# Patient Record
Sex: MALE | Race: WHITE | Employment: FULL TIME | URBAN - METROPOLITAN AREA
[De-identification: names, ages, dates, MRNs, and addresses within clinical notes are randomized per-mention and may not be internally consistent; named-entity substitution may affect disease eponyms.]

---

## 2021-09-27 ENCOUNTER — HOSPITAL ENCOUNTER (OUTPATIENT)
Age: 51
Setting detail: OBSERVATION
Discharge: HOME | End: 2021-09-28
Attending: EMERGENCY MEDICINE
Payer: OTHER GOVERNMENT

## 2021-09-27 ENCOUNTER — APPOINTMENT (OUTPATIENT)
Dept: GENERAL RADIOLOGY | Age: 51
End: 2021-09-27
Payer: OTHER GOVERNMENT

## 2021-09-27 ENCOUNTER — APPOINTMENT (OUTPATIENT)
Dept: CT IMAGING | Age: 51
End: 2021-09-27
Payer: OTHER GOVERNMENT

## 2021-09-27 ENCOUNTER — APPOINTMENT (OUTPATIENT)
Dept: ULTRASOUND IMAGING | Age: 51
End: 2021-09-27
Payer: OTHER GOVERNMENT

## 2021-09-27 ENCOUNTER — APPOINTMENT (OUTPATIENT)
Dept: MRI IMAGING | Age: 51
End: 2021-09-27
Payer: OTHER GOVERNMENT

## 2021-09-27 VITALS
DIASTOLIC BLOOD PRESSURE: 89 MMHG | WEIGHT: 156 LBS | SYSTOLIC BLOOD PRESSURE: 130 MMHG | RESPIRATION RATE: 16 BRPM | HEIGHT: 69 IN | HEART RATE: 84 BPM | OXYGEN SATURATION: 95 % | BODY MASS INDEX: 23.11 KG/M2 | TEMPERATURE: 98.2 F

## 2021-09-27 DIAGNOSIS — R42 DIZZINESS: Primary | ICD-10-CM

## 2021-09-27 DIAGNOSIS — R11.2 NON-INTRACTABLE VOMITING WITH NAUSEA, UNSPECIFIED VOMITING TYPE: ICD-10-CM

## 2021-09-27 LAB
ALBUMIN SERPL-MCNC: 4.7 GM/DL (ref 3.4–5)
ALP BLD-CCNC: 84 IU/L (ref 40–129)
ALT SERPL-CCNC: 19 U/L (ref 10–40)
ANION GAP SERPL CALCULATED.3IONS-SCNC: 15 MMOL/L (ref 4–16)
AST SERPL-CCNC: 22 IU/L (ref 15–37)
BASOPHILS ABSOLUTE: 0.1 K/CU MM
BASOPHILS RELATIVE PERCENT: 0.9 % (ref 0–1)
BILIRUB SERPL-MCNC: 0.2 MG/DL (ref 0–1)
BUN BLDV-MCNC: 16 MG/DL (ref 6–23)
CALCIUM SERPL-MCNC: 9.3 MG/DL (ref 8.3–10.6)
CHLORIDE BLD-SCNC: 97 MMOL/L (ref 99–110)
CHP ED QC CHECK: NORMAL
CO2: 20 MMOL/L (ref 21–32)
CREAT SERPL-MCNC: 0.9 MG/DL (ref 0.9–1.3)
DIFFERENTIAL TYPE: ABNORMAL
EKG ATRIAL RATE: 88 BPM
EKG DIAGNOSIS: NORMAL
EKG P AXIS: 47 DEGREES
EKG P-R INTERVAL: 150 MS
EKG Q-T INTERVAL: 356 MS
EKG QRS DURATION: 106 MS
EKG QTC CALCULATION (BAZETT): 430 MS
EKG R AXIS: 61 DEGREES
EKG T AXIS: 47 DEGREES
EKG VENTRICULAR RATE: 88 BPM
EOSINOPHILS ABSOLUTE: 0.2 K/CU MM
EOSINOPHILS RELATIVE PERCENT: 1.9 % (ref 0–3)
GFR AFRICAN AMERICAN: >60 ML/MIN/1.73M2
GFR NON-AFRICAN AMERICAN: >60 ML/MIN/1.73M2
GLUCOSE BLD-MCNC: 118 MG/DL
GLUCOSE BLD-MCNC: 118 MG/DL
GLUCOSE BLD-MCNC: 118 MG/DL (ref 70–99)
GLUCOSE BLD-MCNC: 126 MG/DL (ref 70–99)
HCT VFR BLD CALC: 47.5 % (ref 42–52)
HEMOGLOBIN: 16.3 GM/DL (ref 13.5–18)
IMMATURE NEUTROPHIL %: 0.4 % (ref 0–0.43)
INR BLD: 0.98 INDEX
LYMPHOCYTES ABSOLUTE: 4.8 K/CU MM
LYMPHOCYTES RELATIVE PERCENT: 45.6 % (ref 24–44)
MAGNESIUM: 2 MG/DL (ref 1.8–2.4)
MCH RBC QN AUTO: 29.7 PG (ref 27–31)
MCHC RBC AUTO-ENTMCNC: 34.3 % (ref 32–36)
MCV RBC AUTO: 86.7 FL (ref 78–100)
MONOCYTES ABSOLUTE: 0.8 K/CU MM
MONOCYTES RELATIVE PERCENT: 7.4 % (ref 0–4)
NUCLEATED RBC %: 0 %
PDW BLD-RTO: 12 % (ref 11.7–14.9)
PLATELET # BLD: 312 K/CU MM (ref 140–440)
PMV BLD AUTO: 9.1 FL (ref 7.5–11.1)
POTASSIUM SERPL-SCNC: 3.3 MMOL/L (ref 3.5–5.1)
PROTHROMBIN TIME: 12.7 SECONDS (ref 11.7–14.5)
RBC # BLD: 5.48 M/CU MM (ref 4.6–6.2)
SEGMENTED NEUTROPHILS ABSOLUTE COUNT: 4.6 K/CU MM
SEGMENTED NEUTROPHILS RELATIVE PERCENT: 43.8 % (ref 36–66)
SODIUM BLD-SCNC: 132 MMOL/L (ref 135–145)
TOTAL IMMATURE NEUTOROPHIL: 0.04 K/CU MM
TOTAL NUCLEATED RBC: 0 K/CU MM
TOTAL PROTEIN: 7.9 GM/DL (ref 6.4–8.2)
TROPONIN T: <0.01 NG/ML
WBC # BLD: 10.6 K/CU MM (ref 4–10.5)

## 2021-09-27 PROCEDURE — 93010 ELECTROCARDIOGRAM REPORT: CPT | Performed by: INTERNAL MEDICINE

## 2021-09-27 PROCEDURE — 93880 EXTRACRANIAL BILAT STUDY: CPT

## 2021-09-27 PROCEDURE — 96372 THER/PROPH/DIAG INJ SC/IM: CPT

## 2021-09-27 PROCEDURE — 70544 MR ANGIOGRAPHY HEAD W/O DYE: CPT

## 2021-09-27 PROCEDURE — 84484 ASSAY OF TROPONIN QUANT: CPT

## 2021-09-27 PROCEDURE — 6370000000 HC RX 637 (ALT 250 FOR IP): Performed by: FAMILY MEDICINE

## 2021-09-27 PROCEDURE — 96374 THER/PROPH/DIAG INJ IV PUSH: CPT

## 2021-09-27 PROCEDURE — 6360000002 HC RX W HCPCS: Performed by: FAMILY MEDICINE

## 2021-09-27 PROCEDURE — 70450 CT HEAD/BRAIN W/O DYE: CPT

## 2021-09-27 PROCEDURE — 6370000000 HC RX 637 (ALT 250 FOR IP): Performed by: EMERGENCY MEDICINE

## 2021-09-27 PROCEDURE — 93005 ELECTROCARDIOGRAM TRACING: CPT | Performed by: EMERGENCY MEDICINE

## 2021-09-27 PROCEDURE — 85610 PROTHROMBIN TIME: CPT

## 2021-09-27 PROCEDURE — 71045 X-RAY EXAM CHEST 1 VIEW: CPT

## 2021-09-27 PROCEDURE — G0378 HOSPITAL OBSERVATION PER HR: HCPCS

## 2021-09-27 PROCEDURE — 70551 MRI BRAIN STEM W/O DYE: CPT

## 2021-09-27 PROCEDURE — 2580000003 HC RX 258: Performed by: FAMILY MEDICINE

## 2021-09-27 PROCEDURE — 92610 EVALUATE SWALLOWING FUNCTION: CPT | Performed by: SPEECH-LANGUAGE PATHOLOGIST

## 2021-09-27 PROCEDURE — 83735 ASSAY OF MAGNESIUM: CPT

## 2021-09-27 PROCEDURE — 97163 PT EVAL HIGH COMPLEX 45 MIN: CPT

## 2021-09-27 PROCEDURE — 80053 COMPREHEN METABOLIC PANEL: CPT

## 2021-09-27 PROCEDURE — 99205 OFFICE O/P NEW HI 60 MIN: CPT | Performed by: PSYCHIATRY & NEUROLOGY

## 2021-09-27 PROCEDURE — 99285 EMERGENCY DEPT VISIT HI MDM: CPT

## 2021-09-27 PROCEDURE — 36415 COLL VENOUS BLD VENIPUNCTURE: CPT

## 2021-09-27 PROCEDURE — 82962 GLUCOSE BLOOD TEST: CPT

## 2021-09-27 PROCEDURE — 97112 NEUROMUSCULAR REEDUCATION: CPT

## 2021-09-27 PROCEDURE — 96375 TX/PRO/DX INJ NEW DRUG ADDON: CPT

## 2021-09-27 PROCEDURE — 97116 GAIT TRAINING THERAPY: CPT

## 2021-09-27 PROCEDURE — 85025 COMPLETE CBC W/AUTO DIFF WBC: CPT

## 2021-09-27 PROCEDURE — 6360000002 HC RX W HCPCS: Performed by: EMERGENCY MEDICINE

## 2021-09-27 RX ORDER — SODIUM CHLORIDE 0.9 % (FLUSH) 0.9 %
5-40 SYRINGE (ML) INJECTION EVERY 12 HOURS SCHEDULED
Status: DISCONTINUED | OUTPATIENT
Start: 2021-09-27 | End: 2021-09-28 | Stop reason: HOSPADM

## 2021-09-27 RX ORDER — ACETAMINOPHEN 325 MG/1
650 TABLET ORAL EVERY 4 HOURS PRN
Status: DISCONTINUED | OUTPATIENT
Start: 2021-09-27 | End: 2021-09-28 | Stop reason: HOSPADM

## 2021-09-27 RX ORDER — AMLODIPINE BESYLATE 2.5 MG/1
1.25 TABLET ORAL
Status: DISCONTINUED | OUTPATIENT
Start: 2021-09-27 | End: 2021-09-28 | Stop reason: HOSPADM

## 2021-09-27 RX ORDER — ACETAMINOPHEN 650 MG/1
650 SUPPOSITORY RECTAL EVERY 4 HOURS PRN
Status: DISCONTINUED | OUTPATIENT
Start: 2021-09-27 | End: 2021-09-28 | Stop reason: HOSPADM

## 2021-09-27 RX ORDER — LABETALOL HYDROCHLORIDE 5 MG/ML
10 INJECTION, SOLUTION INTRAVENOUS EVERY 10 MIN PRN
Status: DISCONTINUED | OUTPATIENT
Start: 2021-09-27 | End: 2021-09-28 | Stop reason: HOSPADM

## 2021-09-27 RX ORDER — POTASSIUM CHLORIDE 20 MEQ/1
20 TABLET, EXTENDED RELEASE ORAL
Status: DISCONTINUED | OUTPATIENT
Start: 2021-09-27 | End: 2021-09-28 | Stop reason: HOSPADM

## 2021-09-27 RX ORDER — ONDANSETRON 4 MG/1
4 TABLET, ORALLY DISINTEGRATING ORAL EVERY 8 HOURS PRN
Status: DISCONTINUED | OUTPATIENT
Start: 2021-09-27 | End: 2021-09-28 | Stop reason: HOSPADM

## 2021-09-27 RX ORDER — POLYETHYLENE GLYCOL 3350 17 G/17G
17 POWDER, FOR SOLUTION ORAL DAILY PRN
Status: DISCONTINUED | OUTPATIENT
Start: 2021-09-27 | End: 2021-09-28 | Stop reason: HOSPADM

## 2021-09-27 RX ORDER — MECLIZINE HYDROCHLORIDE 25 MG/1
25 TABLET ORAL ONCE
Status: COMPLETED | OUTPATIENT
Start: 2021-09-27 | End: 2021-09-27

## 2021-09-27 RX ORDER — SODIUM CHLORIDE 0.9 % (FLUSH) 0.9 %
5-40 SYRINGE (ML) INJECTION PRN
Status: DISCONTINUED | OUTPATIENT
Start: 2021-09-27 | End: 2021-09-28 | Stop reason: HOSPADM

## 2021-09-27 RX ORDER — ONDANSETRON 2 MG/ML
4 INJECTION INTRAMUSCULAR; INTRAVENOUS ONCE
Status: COMPLETED | OUTPATIENT
Start: 2021-09-27 | End: 2021-09-27

## 2021-09-27 RX ORDER — DIPHENHYDRAMINE HYDROCHLORIDE 50 MG/ML
25 INJECTION INTRAMUSCULAR; INTRAVENOUS ONCE
Status: COMPLETED | OUTPATIENT
Start: 2021-09-27 | End: 2021-09-27

## 2021-09-27 RX ORDER — POTASSIUM CHLORIDE 20 MEQ/1
20 TABLET, EXTENDED RELEASE ORAL
COMMUNITY

## 2021-09-27 RX ORDER — ONDANSETRON 2 MG/ML
4 INJECTION INTRAMUSCULAR; INTRAVENOUS EVERY 6 HOURS PRN
Status: DISCONTINUED | OUTPATIENT
Start: 2021-09-27 | End: 2021-09-28 | Stop reason: HOSPADM

## 2021-09-27 RX ORDER — ATORVASTATIN CALCIUM 20 MG/1
80 TABLET, FILM COATED ORAL NIGHTLY
Status: DISCONTINUED | OUTPATIENT
Start: 2021-09-27 | End: 2021-09-28 | Stop reason: HOSPADM

## 2021-09-27 RX ORDER — SODIUM CHLORIDE 9 MG/ML
25 INJECTION, SOLUTION INTRAVENOUS CONTINUOUS
Status: DISCONTINUED | OUTPATIENT
Start: 2021-09-27 | End: 2021-09-28 | Stop reason: HOSPADM

## 2021-09-27 RX ADMIN — SODIUM CHLORIDE 25 ML: 9 INJECTION, SOLUTION INTRAVENOUS at 16:54

## 2021-09-27 RX ADMIN — POTASSIUM CHLORIDE 20 MEQ: 1500 TABLET, EXTENDED RELEASE ORAL at 16:49

## 2021-09-27 RX ADMIN — DIPHENHYDRAMINE HYDROCHLORIDE 25 MG: 50 INJECTION INTRAMUSCULAR; INTRAVENOUS at 09:43

## 2021-09-27 RX ADMIN — ENOXAPARIN SODIUM 40 MG: 40 INJECTION SUBCUTANEOUS at 16:49

## 2021-09-27 RX ADMIN — ONDANSETRON 4 MG: 2 INJECTION INTRAMUSCULAR; INTRAVENOUS at 07:27

## 2021-09-27 RX ADMIN — MECLIZINE HYDROCHLORIDE 25 MG: 25 TABLET ORAL at 07:27

## 2021-09-27 NOTE — ED NOTES
Dr Tadeo Shannon states not to wait for neuro on stoke monitor r/t pt refusing CTA.      Candice Fox RN  09/27/21 8962

## 2021-09-27 NOTE — Clinical Note
Patient Class: Observation [104]   REQUIRED: Diagnosis: Dizziness [545061]   Estimated Length of Stay: Estimated stay of less than 2 midnights

## 2021-09-27 NOTE — ED PROVIDER NOTES
Paying Living Expenses:    Food Insecurity:     Worried About Running Out of Food in the Last Year:     [address] in the Last Year:    Transportation Needs:     Lack of Transportation (Medical):  Lack of Transportation (Non-Medical):    Physical Activity:     Days of Exercise per Week:     Minutes of Exercise per Session:    Stress:     Feeling of Stress :    Social Connections:     Frequency of Communication with Friends and Family:     Frequency of Social Gatherings with Friends and Family:     Attends Mandaen Services:     Active Member of Clubs or Organizations:     Attends Club or Organization Meetings:     Marital Status:    Intimate Partner Violence:     Fear of Current or Ex-Partner:     Emotionally Abused:     Physically Abused:     Sexually Abused:      Current Facility-Administered Medications   Medication Dose Route Frequency Provider Last Rate Last Admin    diphenhydrAMINE (BENADRYL) injection 25 mg  25 mg IntraVENous Once Wilbur Theodore MD         Current Outpatient Medications   Medication Sig Dispense Refill    amLODIPine (NORVASC) 2.5 MG tablet Take 2.5 mg by mouth daily.  aspirin 81 MG tablet Take 81 mg by mouth daily.  naproxen (NAPROSYN) 250 MG tablet Take 250 mg by mouth 2 times daily (with meals). Allergies   Allergen Reactions    Iodine     Midodrine        Nursing Notes Reviewed    Physical Exam:  ED Triage Vitals [09/27/21 0642]   Enc Vitals Group      BP       Pulse       Resp       Temp       Temp src       SpO2       Weight 156 lb (70.8 kg)      Height 5' 9\" (1.753 m)      Head Circumference       Peak Flow       Pain Score       Pain Loc       Pain Edu? Excl. in [address]? GENERAL APPEARANCE: Awake and alert. Cooperative. Anxious. HEAD: Normocephalic. Atraumatic. EYES: EOM's grossly intact. Sclera anicteric. ENT: Mucous membranes are moist. Tolerates saliva. No trismus. NECK: Supple. No meningismus. Trachea midline. HEART: RRR. Radial pulses 2+. LUNGS: Respirations unlabored. CTAB  ABDOMEN: Soft. Non-tender. No guarding or rebound. EXTREMITIES: No acute deformities. SKIN: Warm and dry. NEUROLOGICAL:   Cristina Montenegro NIH Stroke Scale at 8:53 AM is:  Level of Consciousness:  0 - alert; keenly responsive    LOC Questions:  0 - answers both questions correctly    LOC Commands:  0 - performs both tasks correctly    Best Gaze:  0 - normal    Visual Fields:  0 - no visual loss    Facial Palsy:  0 - normal symmetric movement    Motor-Arm-Left:  0 - no drift, limb holds 90 (or 45) degrees for full 10 seconds    Motor-Leg-Left:  0 - no drift; leg holds 30 degree position for full 5 seconds    Motor-Arm-Right:  0 - no drift, limb holds 90 (or 45) degrees for full 10 seconds    Motor-Leg-Right:  0 - no drift; leg holds 30 degree position for full 5 seconds    Limb Ataxia:  0 - absent    Sensory:  0 - normal; no sensory loss    Best Language:  0 - no aphasia, normal    Dysarthria:  0 - normal    Extinction and Inattention:  0 - no abnormality  PSYCHIATRIC: Normal mood.     I have reviewed and interpreted all of the currently available lab results from this visit (if applicable):  Results for orders placed or performed during the hospital encounter of 09/27/21   CBC Auto Differential   Result Value Ref Range    WBC 10.6 (H) 4.0 - 10.5 K/CU MM    RBC 5.48 4.6 - 6.2 M/CU MM    Hemoglobin 16.3 13.5 - 18.0 GM/DL    Hematocrit 47.5 42 - 52 %    MCV 86.7 78 - 100 FL    MCH 29.7 27 - 31 PG    MCHC 34.3 32.0 - 36.0 %    RDW 12.0 11.7 - 14.9 %    Platelets 141 459 - 744 K/CU MM    MPV 9.1 7.5 - 11.1 FL    Differential Type AUTOMATED DIFFERENTIAL     Segs Relative 43.8 36 - 66 %    Lymphocytes % 45.6 (H) 24 - 44 %    Monocytes % 7.4 (H) 0 - 4 %    Eosinophils % 1.9 0 - 3 %    Basophils % 0.9 0 - 1 %    Segs Absolute 4.6 K/CU MM    Lymphocytes Absolute 4.8 K/CU MM    Monocytes Absolute 0.8 K/CU MM    Eosinophils Absolute 0.2 K/CU MM    Basophils Absolute 0.1 K/CU MM    Nucleated RBC % 0.0 %    Total Nucleated RBC 0.0 K/CU MM    Total Immature Neutrophil 0.04 K/CU MM    Immature Neutrophil % 0.4 0 - 0.43 %   Comprehensive Metabolic Panel w/ Reflex to MG   Result Value Ref Range    Sodium 132 (L) 135 - 145 MMOL/L    Potassium 3.3 (L) 3.5 - 5.1 MMOL/L    Chloride 97 (L) 99 - 110 mMol/L    CO2 20 (L) 21 - 32 MMOL/L    BUN 16 6 - 23 MG/DL    CREATININE 0.9 0.9 - 1.3 MG/DL    Glucose 126 (H) 70 - 99 MG/DL    Calcium 9.3 8.3 - 10.6 MG/DL    Albumin 4.7 3.4 - 5.0 GM/DL    Total Protein 7.9 6.4 - 8.2 GM/DL    Total Bilirubin 0.2 0.0 - 1.0 MG/DL    ALT 19 10 - 40 U/L    AST 22 15 - 37 IU/L    Alkaline Phosphatase 84 40 - 129 IU/L    GFR Non-African American >60 >60 mL/min/1.73m2    GFR African American >60 >60 mL/min/1.73m2    Anion Gap 15 4 - 16   Troponin   Result Value Ref Range    Troponin T <0.010 <0.01 NG/ML   Protime-INR   Result Value Ref Range    Protime 12.7 11.7 - 14.5 SECONDS    INR 0.98 INDEX   POCT Glucose   Result Value Ref Range    Glucose 118 mg/dL   POCT Glucose   Result Value Ref Range    Glucose 118 mg/dL    QC OK? ok    POCT Glucose   Result Value Ref Range    POC Glucose 118 (H) 70 - 99 MG/DL   EKG 12 Lead   Result Value Ref Range    Ventricular Rate 88 BPM    Atrial Rate 88 BPM    P-R Interval 150 ms    QRS Duration 106 ms    Q-T Interval 356 ms    QTc Calculation (Bazett) 430 ms    P Axis 47 degrees    R Axis 61 degrees    T Axis 47 degrees    Diagnosis       Normal sinus rhythm  Incomplete right bundle branch block  Borderline ECG  When compared with ECG of 08-MAY-2017 19:43,  Nonspecific T wave abnormality no longer evident in Inferior leads          Radiographs (if obtained):  [] The following radiograph was interpreted by myself in the absence of a radiologist:  [x] Radiologist's Report Reviewed:     CT HEAD WO CONTRAST (Final result)  Result time 09/27/21 07:10:18  Final result by Leonardo Humphrey MD (09/27/21 07:10:18)                Impression: No gross acute process. Findings were discussed with Hien RM at 07:09 on 9/27/2021.5             Narrative:    EXAMINATION:   CT OF THE HEAD WITHOUT CONTRAST  9/27/2021 6:54 am     TECHNIQUE:   CT of the head was performed without the administration of intravenous   contrast. Dose modulation, iterative reconstruction, and/or weight based   adjustment of the mA/kV was utilized to reduce the radiation dose to as low   as reasonably achievable. COMPARISON:   05/08/2017     HISTORY:   ORDERING SYSTEM PROVIDED HISTORY: states woke up dizzy, feels falling to left   TECHNOLOGIST PROVIDED HISTORY:   Reason for exam:->states woke up dizzy, feels falling to left   Has a \"code stroke\" or \"stroke alert\" been called? ->Yes   Decision Support Exception - unselect if not a suspected or confirmed   emergency medical condition->Emergency Medical Condition (MA)   Reason for Exam: states woke up dizzy, feels falling to left     FINDINGS:   BRAIN/VENTRICLES: Artifact limits evaluation of the posterior fossa.  No mass   effect or midline shift.  No hydrocephalus.  No gross acute hemorrhage.  Gray   matter white matter differentiation is preserved. ORBITS: The visualized portion of the orbits demonstrate no acute abnormality. SINUSES: The visualized paranasal sinuses and mastoid air cells demonstrate   no acute abnormality. SOFT TISSUES/SKULL:  No acute abnormality of the visualized skull or soft   tissues.                      XR CHEST PORTABLE (Final result)  Result time 09/27/21 08:19:07  Final result by Merritt Orozco MD (09/27/21 08:19:07)                Impression:    No acute process. Narrative:    EXAMINATION:   ONE XRAY VIEW OF THE CHEST     9/27/2021 7:08 am     COMPARISON:   None.      HISTORY:   ORDERING SYSTEM PROVIDED HISTORY: dizzy   TECHNOLOGIST PROVIDED HISTORY:   Reason for exam:->dizzy   Reason for Exam: dizzy   Acuity: Unknown   Type of Exam: Unknown   Relevant Medical/Surgical normal   Platelet count < 630,672/NH0   Current or recent use of anticoagulants including but not limited to: Warfarin (Coumadin®) within 5 days or INR > 1.7     Apixiban (Eliquis®) within 48 hours**     Dabigatran (Pradaxa®) within 72 hours**     Enoxaparin (Lovenox®) within 24 hours**     Fondaparinux (Arixtra®) within 72 hours**     Rivaroxaban (Xarelto®) within 24 hours**     **For patients with normal renal function. Activity may be significantly   prolonged in the elderly or patients with renal dysfunction    *Remains thrombolytic eligible if BP/BG corrected while in treatment window      5. Patient will be dispositioned to [facility]    MDM: Stroke alert called as patient has symptoms of dizziness, states he feels ataxic and like he is falling to the left. States he woke up this morning like that. States last known well around 9 PM last night did not feel this way. Patient's Accu-Chek is normal at 118. Patient would not be a TPA candidate as he is not presented within 4-1/2 hours as he woke up with symptoms. He is refusing CTA with premedication due to his previous reaction to iodine which was not anaphylaxis. Did discuss with patient this will not allow us to rule out a large vessel occlusion for possible intervention as might be needed. He does voice understanding to this and continues to refuse. Radiology did call me at 7:08 AM with a negative Noncon head CT. CBC shows a white count of 10.6. Hemoglobin is 16.3. CMP decreased potassium of 3.3. Mildly decreased CO2 of 20. Normal anion gap of 15. Troponin normal. Coags normal. CXR no acute process. Patient attempted to sit up and ambulate but still has dizziness, not made better with meds thus far. Discussed with patient because of this he will need to be admitted for a MRI to rule out cerebellar stroke. Did discuss with him that I spoke with the stroke doctor who states he is okay to be admitted here for MRI/MRI and rule out stroke.   Did discuss with patient that he is not a candidate to be transferred to [facility] secondary to him refusing CTA and unable to currently rule out large vessel occlusion at this time. Patient does understand that this did somewhat hinder his workup. Agrees to admit. Clinical Impression:  1. Dizziness    2.  Non-intractable vomiting with nausea, unspecified vomiting type      (Please note that portions of this note may have been completed with a voice recognition program. Efforts were made to edit the dictations but occasionally words are mis-transcribed.)    MD Monisha Carmichael MD  09/27/21 [id number]

## 2021-09-27 NOTE — PLAN OF CARE
Pt needed to go to RR- unable to ambulate, with assistance, urinal provided, 500ccs of light Yellow liquid in urinal - disposed of  Pt currently in MRI- Dr. Logan Vázquez notified of preliminary US findings, awaiting Radiologist report

## 2021-09-27 NOTE — PROGRESS NOTES
Speech Language Pathology  Facility/Department: [address] [place] [address] EMERGENCY DEPARTMENT   CLINICAL BEDSIDE SWALLOW EVALUATION    NAME: Lobo Gunn  : 1970  MRN: [id number]    ADMISSION DATE: 2021  ADMITTING DIAGNOSIS: has Dizziness on their problem list.     Impression  Dysphagia Diagnosis: Swallow function appears grossly intact  Dysphagia Outcome Severity Scale: Level 7: Normal in all situations     Lobo Gunn was seen for a clinical bedside swallow evaluation following admission for Dizziness, hypokalemia, hyponatremia, HTN, and panic attacks. Pt was alert, cooperative and oriented x 4. He gave a thorough [place] and reports difficulty swallowing large pills. He followed complex commands for oral motor exam demonstrated mild left orofacial weakness intermittently. Vocal quality and cough strength were WNL. PO trials of regular solids and thins by straw completed with normal oral swallow and 0 s/s aspiration. Swallow timing and laryngeal excursion appeared WNL. Speech and language screened and judged to be WNL. Cognition appears at baseline. Recommend:  Regular/Thins. No needs identified at this time. ONSET DATE: 2021     Recent Chest Xray/CT of Chest:  negative    Date of Eval: 2021  Evaluating Therapist: JYOTI Chavez    Current Diet level:  Current Diet : NPO  Current Liquid Diet : NPO    Primary Complaint  Patient Complaint: reports difficulty with pills    Pain:  denies     Reason for Referral  Lobo Gunn was referred for a bedside swallow evaluation to assess the efficiency of his swallow function, identify signs and symptoms of aspiration and make recommendations regarding safe dietary consistencies, effective compensatory strategies, and safe eating environment. Treatment Plan  Requires SLP Intervention: No  Duration/Frequency of Treatment: n/a  D/C Recommendations:  To be determined     Recommended Diet and Intervention  Diet Solids Recommendation: Regular  Liquid Consistency Recommendation: Thin  Recommended Form of Meds: PO  Recommendations: Self feed     Compensatory Swallowing Strategies     Treatment/Goals  Short-term Goals  Timeframe for Short-term Goals: n/a  Long-term Goals  Timeframe for Long-term Goals: n/a    General  Chart Reviewed: Yes  Behavior/Cognition: Alert; Cooperative  Respiratory Status: Room air  O2 Device: None (Room air)  Communication Observation: Functional  Follows Directions: Complex  Dentition: Adequate  Patient Positioning: Upright in bed  Baseline Vocal Quality: Normal  Volitional Cough: Strong  Prior Dysphagia History: none charted  Consistencies Administered: Reg solid; Thin - straw    Vision/Hearing  Vision  Vision: Within Functional Limits    Oral Motor Deficits  Oral/Motor  Oral Motor: Within functional limits    Oral Phase Dysfunction  Oral Phase  Oral Phase: WNL     Indicators of Pharyngeal Phase Dysfunction   Pharyngeal Phase  Pharyngeal Phase: WNL    Prognosis  Prognosis  Prognosis for safe diet advancement: excellent  Individuals consulted  Consulted and agree with results and recommendations: Patient    Education  Patient Education: results WNL  Patient Education Response: Verbalizes understanding  Safety Devices in place: Yes  Type of devices: All fall risk precautions in place; Left in bed       Therapy Time  SLP Individual Minutes  Time In: 7296  Time Out: 601 [address]  Minutes: [place], [place]  9/27/2021 2:52 PM
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
## 2021-09-27 NOTE — ED NOTES
Patient states he had to have a BM. Placed on bedpan. Call light in reach.       Niles Merchant, RN  09/27/21 [id number]

## 2021-09-27 NOTE — CONSULTS
SubCUTAneous Daily    atorvastatin  80 mg Oral Nightly    potassium bicarb-citric acid  50 mEq Oral BID     Continuous Infusions:   sodium chloride       PRN Meds:.sodium chloride flush, ondansetron **OR** ondansetron, polyethylene glycol, acetaminophen **OR** acetaminophen, labetalol    Allergies   Allergen Reactions    Aspirin Hives     Patient states he get hives and has stomach irritation.  Iodine     Midodrine      Social History     Socioeconomic History    Marital status:      Spouse name: Not on file    Number of children: Not on file    Years of education: Not on file    Highest education level: Not on file   Occupational History    Not on file   Tobacco Use    Smoking status: Never Smoker    Smokeless tobacco: Current User     Types: Chew   Substance and Sexual Activity    Alcohol use: No    Drug use: Not Currently    Sexual activity: Not on file   Other Topics Concern    Not on file   Social History Narrative    Not on file     Social Determinants of Health     Financial Resource Strain:     Difficulty of Paying Living Expenses:    Food Insecurity:     Worried About Running Out of Food in the Last Year:     [address] in the Last Year:    Transportation Needs:     Lack of Transportation (Medical):  Lack of Transportation (Non-Medical):    Physical Activity:     Days of Exercise per Week:     Minutes of Exercise per Session:    Stress:     Feeling of Stress :    Social Connections:     Frequency of Communication with Friends and Family:     Frequency of Social Gatherings with Friends and Family:     Attends Rastafarian Services:     Active Member of Clubs or Organizations:     Attends Club or Organization Meetings:     Marital Status:    Intimate Partner Violence:     Fear of Current or Ex-Partner:     Emotionally Abused:     Physically Abused:     Sexually Abused:       History reviewed. No pertinent family history.     Review of Symptoms:    10-point system review completed. All of which are negative except as mentioned above. Physical Exam:      Gen: A&O x 4, NAD, cooperative  HEENT: NC/AT, EOMI, PERRL, mmm, neck supple, no meningeal signs  Heart: RRR, S1S2  Lungs: No respiratory distress  Ext: no edema, no calf tenderness b/l  Psych: normal mood and affect  Skin: no rashes or lesions    NEUROLOGIC EXAM:    Mental Status: A&O to self, location, month and year, NAD, speech clear, language fluent, repetition and naming intact, follows commands appropriately    Cranial Nerve Exam:   CN II-XII: PERRL, VFF, no nystagmus, no gaze paresis, sensation V1-V3 intact b/l, muscles of facial expression symmetric; hearing intact to conversational tone, palate elevates symmetrically, shoulder elevation symmetric and tongue protrudes midline with movement side to side. Motor Exam:       Strength 5/5 UE's/LE's b/l  Tone and bulk normal   No pronator drift    Deep Tendon Reflexes: 2/4 biceps, triceps, brachioradialis, patellar, and 1/4 achilles b/l; flexor plantar responses b/l    Sensation: Intact light touch/vibration UE's/LE's b/l    Coordination/Cerebellum:       Tremors--none      Rapidly alternating movements: no dysdiadochokinesia b/l                Finger-to-Nose: no dysmetria b/l    Gait and stance:      Gait: deferred      LABS:     Recent Labs     09/27/21  0648 09/27/21  0657 09/27/21  0727   WBC  --  10.6*  --    NA  --  132*  --    K  --  3.3*  --    CL  --  97*  --    CO2  --  20*  --    BUN  --  16  --    CREATININE  --  0.9  --    GLUCOSE 118 126* 118   INR  --  0.98  --      Lipid panel: pending  A1C: pending    IMAGING:    CT Head  No gross acute process.     Carotid US  The right internal carotid artery demonstrates 0-50% stenosis.       The left internal carotid artery demonstrates 0-50% stenosis.       Bilateral vertebral arteries are patent with flow in the normal direction.       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  CT angiogram of the neck is recommended for further evaluation. MRI brain wo:   Mild cerebral atrophy.  Mild chronic small vessel ischemic disease.  No acute   brain parenchymal abnormality. MRA brain wo: Unremarkable MRA of the head. ASSESSMENT/PLAN:   45 yo male with history of lung tumor, HTN, panic attack, tinnitus presents for acute onset of dizziness described as and gait disturbance likely secondary to peripheral etiology Carotid US did note possible carotid body tumor with recommended CTA head and neck but patient has contrast allergy and is refusing even with prophylactic treatment for reaction. However, MRI and MRA brain are non acute. Physical exam is non focal/lateralizing. Plan of care as follows:     1. Dizziness described as floor moving causing gait disturbance likely secondary to peripheral etiology   · CT Head: see above  · Carotid US: see above  · Patient refused CTA head and neck as he has contrast allergy   · MRI brain wo: see above  · MRA brain wo: see above  · Continue secondary stroke prevention with statin therapy  · PT/OT/ST per their recommendations  · Recommend outpatient follow up with ENT regarding tinnitus and ear fullness  · Recommend vestibular therapy outpatient    Patient care discussed with attending physician, Dr. Mauricio Hercules. Thank you for allowing us to participate in the care of your patient. If there are any questions regarding evaluation please feel free to contact us. [facility] [place], [place], 9/27/2021       ------------------------------------    Attending Note:  I have rounded on this patient with ZORA Crespo. I have reviewed the chart and we have discussed this case in detail. The patient was seen and examined by myself. Pertinent labs and imaging have been personally reviewed. Our findings and impressions were discussed with the patient. I concur with the Physician Assistant's assessment and plan.     Symptoms appear consistent with a vestibular

## 2021-09-27 NOTE — PROGRESS NOTES
Medication History  [facility]    Patient Name: Saray Lerma 1970     Medication history has been completed by: Tim Luciano CPhT    Source(s) of information: patient and progress note from the [address] [place] [address] Physician: No primary care provider on file. Pharmacy: [facility]    Allergies as of 09/27/2021 - Fully Reviewed 09/27/2021   Allergen Reaction Noted    Aspirin Hives 09/27/2021    Iodine  09/20/2014    Midodrine  09/20/2014        Prior to Admission medications    Medication Sig Start Date End Date Taking? Authorizing Provider   potassium chloride (KLOR-CON M) 20 MEQ extended release tablet Take 20 mEq by mouth Daily with supper   Yes Historical Provider, MD   amLODIPine (NORVASC) 2.5 MG tablet Take 1.25 mg by mouth Daily with lunch 09/27/21 Patient states he splits a 2.5 mg tablet in half. Yes Historical Provider, MD     Medications added or changed (ex. new medication, dosage change, interval change, formulation change):  Potassium (added)  Amlodipine dosage change from 2.5 mg to 1.25    Medications removed from list (include reason, ex. noncompliance, medication cost, therapy complete etc.):   Aspirin patient states he has an allergy to this  Naproxen not taking    Comments:  Medication list reviewed with patient and verified per progress note from the [facility] 09/23/21  Patient states he works as night and takes medications in the afternoon.     To my knowledge the above medication history is accurate as of 9/27/2021 9:46 AM.   Tim Luciano CPhT   9/27/2021 9:46 AM

## 2021-09-27 NOTE — ED NOTES
Pt states that he is unable to ambulate r/t being \"too dizzy to stand\". Dr Amalia Primrose notified.      Henry Tinsley RN  09/27/21 9321

## 2021-09-27 NOTE — ED NOTES
Pt currently in CT scan with this nurse accompanied. Generalized shaking noted. Pt reports dizziness and feeling like he is falling to the L side. EMS report emesis x1 when going out to cot. Reported gait unsteady d/t dizziness.       Karen Nunn RN  09/27/21 5485

## 2021-09-28 PROCEDURE — G0378 HOSPITAL OBSERVATION PER HR: HCPCS

## 2021-09-28 NOTE — ED NOTES
Pt states he is feeling better and his dizziness has gone away when he walks. Pt states he wants to go home. Kavitha LEON notified.       Igor Luna RN  09/27/21 2044

## 2021-09-28 NOTE — PROGRESS NOTES
Independent  Transfer Assistance: Independent  Active : Yes  Occupation: Full time employment  Type of occupation:   Cognition   Cognition  Overall Cognitive Status: Exceptions  Arousal/Alertness: Appropriate responses to stimuli  Following Commands: Follows all commands without difficulty  Attention Span: Appears intact  Safety Judgement: Decreased awareness of need for safety;Decreased awareness of need for assistance  Problem Solving: Decreased awareness of errors  Insights: Decreased awareness of deficits  Initiation: Requires cues for some  Sequencing: Requires cues for some    Objective             Strength RLE  Comment: knee extension: 4+/5, ankle DF: 5/5  Strength LLE  Comment: knee extension: 4+/5, ankle DF: 5/5  Coordination  Finger to Nose: Normal (bilaterally)  Sensation  Overall Sensation Status: WNL  Bed mobility  Supine to Sit: Contact guard assistance  Sit to Supine: Minimal assistance  Transfers  Sit to Stand: Contact guard assistance  Stand to sit: Minimal Assistance; Moderate Assistance (with verbal cues to feel bed against back of legs, reach back, and sit slowly)  Ambulation  Ambulation?: Yes  Ambulation 1  Surface: level tile  Device: No Device  Assistance: Minimal assistance; Moderate assistance  Quality of Gait: decreased gait speed, decreased step length bilaterally, ataxic, intermittent significant retropulsion, unsteady  Distance: 10 feet + 10 feet  Comments: with verbal and tactile cues to maintain upright posture in order to avoid COM shifting outside of ANKIT; with verbal cues for BLE placement throughout ambulation     Balance  Posture: Fair  Sitting - Static: Good  Sitting - Dynamic: Fair  Standing - Static: Poor;+  Standing - Dynamic: Poor;+      Gait Training:  Cues were given for safety, sequence, device management, balance, posture, awareness, path.       Neuro-Muscular re-education:  Cues were given for position, posture, kinesthetic sense, safety, recruitment, and rationale. Cues were verbal and/or tactile. Plan   Plan  Times per week: 3+  Current Treatment Recommendations: Strengthening, ROM, Balance Training, Functional Mobility Training, Transfer Training, ADL/Self-care Training, Gait Training, Patient/Caregiver Education & Training, IADL Training, Stair training, Equipment Evaluation, Education, & procurement, Neuromuscular Re-education, Pain Management, Home Exercise Program, Positioning, Endurance Training, Safety Education & Training  Safety Devices  Type of devices: All fall risk precautions in place, Left in bed, Bed alarm in place, Call light within reach, Nurse notified, Gait belt, Patient at risk for falls    AM-PAC Score  AM-PAC Inpatient Mobility Raw Score : 13 (09/27/21 2154)  AM-PAC Inpatient T-Scale Score : 36.74 (09/27/21 2154)  Mobility Inpatient CMS 0-100% Score: 64.91 (09/27/21 2154)  Mobility Inpatient CMS G-Code Modifier : CL (09/27/21 2154)          Goals  Long term goals  Time Frame for Long term goals :  In one week:  Long term goal 1: Pt will complete all bed mobility independently  Long term goal 2: Pt will complete sit <> stand transfers with SBAx1  Long term goal 3: Pt will ambulate 150 feet with CGAx1 with LRAD  Long term goal 4: Pt will ascend/descend 6 steps with a handrail with minAx1  Long term goal 5: Pt will independently complete 3 sets of 10 reps of BLE AROM exercises in available and allowed ROM       Time In: 1412  Time Out: 1451  Total Treatment Time: 39  Timed Code Treatment Minutes: [address]ula Logan PT, DPT  License #: [id number]

## 2021-09-28 NOTE — PROGRESS NOTES
Call initiated by: Nursing staff:  Desire  Call addressed around: 9/27/2021 9:07 PM      Reason for call: Patients wants to leave      Orders placed: I spoke with the patient at bedside about the importance of being observed overnight as he has high fall risk. He verbalized understanding and decided to leave AMA. The patient is completely oriented and capable of making decisions at time of this conversation. He is awaiting nurse to bring papers for him to sign.           Rafael Carter, JULIAN - CNP

## 2021-09-28 NOTE — ED NOTES
Kavitha NP at bedside explaining the importance of being observed overnight. Pt aware and still wants to leave AMA. Vitals taken and IV d/c.       India Mcginnis RN  09/28/21 4860

## 2021-10-02 ENCOUNTER — HOSPITAL ENCOUNTER (EMERGENCY)
Age: 51
Discharge: HOME | End: 2021-10-03
Attending: EMERGENCY MEDICINE
Payer: OTHER GOVERNMENT

## 2021-10-02 DIAGNOSIS — R42 VERTIGO: Primary | ICD-10-CM

## 2021-10-02 DIAGNOSIS — H93.13 TINNITUS OF BOTH EARS: ICD-10-CM

## 2021-10-02 DIAGNOSIS — I10 ESSENTIAL HYPERTENSION: ICD-10-CM

## 2021-10-02 PROCEDURE — 93005 ELECTROCARDIOGRAM TRACING: CPT | Performed by: EMERGENCY MEDICINE

## 2021-10-02 PROCEDURE — 99284 EMERGENCY DEPT VISIT MOD MDM: CPT

## 2021-10-02 PROCEDURE — 6370000000 HC RX 637 (ALT 250 FOR IP): Performed by: EMERGENCY MEDICINE

## 2021-10-02 RX ORDER — MECLIZINE HYDROCHLORIDE 25 MG/1
25 TABLET ORAL 3 TIMES DAILY PRN
Qty: 15 TABLET | Refills: 0 | Status: SHIPPED | OUTPATIENT
Start: 2021-10-02 | End: 2021-10-12

## 2021-10-02 RX ORDER — MECLIZINE HYDROCHLORIDE 25 MG/1
25 TABLET ORAL ONCE
Status: DISCONTINUED | OUTPATIENT
Start: 2021-10-02 | End: 2021-10-03 | Stop reason: HOSPADM

## 2021-10-02 RX ORDER — MECLIZINE HYDROCHLORIDE 25 MG/1
25 TABLET ORAL ONCE
Status: COMPLETED | OUTPATIENT
Start: 2021-10-02 | End: 2021-10-02

## 2021-10-02 RX ADMIN — MECLIZINE HYDROCHLORIDE 25 MG: 25 TABLET ORAL at 22:04

## 2021-10-03 VITALS
RESPIRATION RATE: 18 BRPM | HEART RATE: 80 BPM | OXYGEN SATURATION: 97 % | DIASTOLIC BLOOD PRESSURE: 84 MMHG | WEIGHT: 150 LBS | SYSTOLIC BLOOD PRESSURE: 139 MMHG | TEMPERATURE: 98.8 F | HEIGHT: 69 IN | BODY MASS INDEX: 22.22 KG/M2

## 2021-10-03 NOTE — ED NOTES
Bed: ED-27  Expected date:   Expected time:   Means of arrival:   Comments:  ems     Royce Mack RN  10/02/21 2105

## 2021-10-03 NOTE — ED NOTES
States was seen here last week with a full work up and was seen by neuro. Stroke was ruled out at this time. Reports began feeling better but tonight it returned and he feels like he is walking on a trampoline and leaning both ways.           Angel Jacques RN  10/02/21 9000

## 2021-10-03 NOTE — ED PROVIDER NOTES
Emergency Department Encounter    Patient: Brea Mejia  MRN: [id number]  : 1970  Date of Evaluation: 10/3/2021  ED Provider:  Amber Mason DO    Triage Chief Complaint:   Dizziness and Ear Fullness    [place]:  Brea Mejia is a 44 y.o. male with history of hypertension that presents with chief complaint of vertigo and ear fullness. Patient states symptoms started suddenly this evening after eating [employer]. Patient states that it is intermittent and only when he stands up or changes position. Patient has had worsening ear fullness and feels as if he is underwater worse in his right ear than his left. Patient states he had similar symptoms 5 days ago and was admitted to the hospital for further evaluation. He states he has chronic tinnitus. He denies headache, trauma, visual disturbance, nausea, vomiting. At that time he had an MRI brain and an MRI that shows mild cerebral atrophy and mild chronic small vessel ischemic disease. He had carotid ultrasound that demonstrates 0 to 50% stenosis. The time he was evaluated by neurology that thinks symptoms are likely peripheral in etiology with ENT follow-up and vestibular therapy. Patient left AGAINST MEDICAL ADVICE at that time and had not had any follow-up or outpatient medications prescribed.     ROS - see HPI, below listed is current ROS at time of my eval:  General:  No fevers, no chills, no sweats  Eyes:  No recent vison changes, no discharge  ENT:  No sore throat, no nasal congestion, no ear pain  Cardiovascular:  No chest pain, no palpitations  Respiratory:  No shortness of breath, no cough, no wheezing  Gastrointestinal:  No pain, no nausea, no vomiting, no diarrhea  Genitourinary:  No dysuria, no hematuria  Musculoskeletal:  No muscle pain, no joint pain, No LLE edema  Skin:  No rash, no erythema  Neurologic:  No speech problems, no headache, no paresthesias  Psychiatric:  No anxiety      Past Medical History:   Diagnosis Date    Hypertension  Lung tumor     Panic attack      No past surgical history on file. No family history on file. Social History     Socioeconomic History    Marital status:      Spouse name: Not on file    Number of children: Not on file    Years of education: Not on file    Highest education level: Not on file   Occupational History    Not on file   Tobacco Use    Smoking status: Never Smoker    Smokeless tobacco: Current User     Types: Chew   Substance and Sexual Activity    Alcohol use: No    Drug use: Not Currently    Sexual activity: Not on file   Other Topics Concern    Not on file   Social History Narrative    Not on file     Social Determinants of Health     Financial Resource Strain:     Difficulty of Paying Living Expenses:    Food Insecurity:     Worried About Running Out of Food in the Last Year:     [address] in the Last Year:    Transportation Needs:     Lack of Transportation (Medical):      Lack of Transportation (Non-Medical):    Physical Activity:     Days of Exercise per Week:     Minutes of Exercise per Session:    Stress:     Feeling of Stress :    Social Connections:     Frequency of Communication with Friends and Family:     Frequency of Social Gatherings with Friends and Family:     Attends Restorationist Services:     Active Member of Clubs or Organizations:     Attends Club or Organization Meetings:     Marital Status:    Intimate Partner Violence:     Fear of Current or Ex-Partner:     Emotionally Abused:     Physically Abused:     Sexually Abused:      Current Facility-Administered Medications   Medication Dose Route Frequency Provider Last Rate Last Admin    meclizine (ANTIVERT) tablet 25 mg  25 mg Oral Once Lois Linares, DO         Current Outpatient Medications   Medication Sig Dispense Refill    meclizine (ANTIVERT) 25 MG tablet Take 1 tablet by mouth 3 times daily as needed for Dizziness 15 tablet 0    potassium chloride (KLOR-CON M) 20 MEQ extended release tablet Take 20 mEq by mouth Daily with supper      amLODIPine (NORVASC) 2.5 MG tablet Take 1.25 mg by mouth Daily with lunch 09/27/21 Patient states he splits a 2.5 mg tablet in half. Allergies   Allergen Reactions    Aspirin Hives     Patient states he get hives and has stomach irritation.  Iodine     Midodrine        Nursing Notes Reviewed    Physical Exam:  Triage VS:    ED Triage Vitals   Enc Vitals Group      BP 10/02/21 2127 (!) 154/86      Pulse 10/02/21 2127 102      Resp 10/02/21 2127 19      Temp 10/02/21 2107 98.8 °F (37.1 °C)      Temp Source 10/02/21 2107 Oral      SpO2 10/02/21 2127 96 %      Weight 10/02/21 2127 150 lb (68 kg)      Height 10/02/21 2127 5' 9\" (1.753 m)      Head Circumference --       Peak Flow --       Pain Score --       Pain Loc --       Pain Edu? --       Excl. in [address]? --          General appearance:  No acute distress. Skin:  Warm. Dry. Eye:  Extraocular movements intact. PERRL   Ears, nose, mouth and throat:  Oral mucosa moist. LEFT TM with clear fluid. Right TM clear. Neck:  Trachea midline. Extremity:  No swelling. Moves all four extremities. Heart:  Regular rate and rhythm, normal S1 & S2, no extra heart sounds. Respiratory:  Lungs clear to auscultation bilaterally. Respirations nonlabored. Abdominal:  Normal bowel sounds. Soft. Nontender. Neurological: Alert and oriented times three, follows commands, speech and language intact. Cranial Wgczio-[id number]-LEA Intact. Motor exam is symmetrical 5 out of 5 all extremities bilaterally. Sensation intact to light touch in all extremities. DTRs- Patellar reflexes bilaterally and symmetrical.  Intact finger to nose and heel to shin. Negative Rhomberg. Wide based gait. NIH 0.   Psychiatric:  Appropriate    I have reviewed and interpreted all of the currently available lab results from this visit (if applicable):  No results found for this visit on 10/02/21.    Radiographs (if obtained):  Radiologist's Report Reviewed:  No results found. EKG (if obtained): (All EKG's are interpreted by myself in the absence of a cardiologist)  Sinus tachycardia with ventricular rate of 105 bpm. Possible left atrial enlargement incomplete right bundle branch block. When compared to previous EKG of 9/27-21 no significant changes. Sinus tachycardia. MDM:  Patient on exam is hypertensive and tachycardic but in no acute distress. Alert was not called as patient had an extensive work-up 5 days ago with the same symptoms ruling out central etiology for patient's vertigo. NIH was 0. Patient did have a wide-based gait. He said the vertigo was worse with changing positions of his head. He also has fluid behind his left TM which could be contributing to his symptoms. Based on neurology consult of his previous admission that he left [address] for they believe this is peripheral in etiology and to follow-up with ENT and possible vestibular therapy. Patient received 2 doses of meclizine with improvement of symptoms. Patient will be discharged home with a prescription for meclizine, ENT referral as well as a work excuse until Tuesday. Return precautions were discussed. Patient is stable for discharge. Clinical Impression:  1. Vertigo    2. Tinnitus of both ears    3.  Essential hypertension      Disposition referral (if applicable):  call below on Monday for ENT follow up next week          [facility] Emergency Department  [facility] [id number] [phone]  Go to   If symptoms worsen    Disposition medications (if applicable):  New Prescriptions    MECLIZINE (ANTIVERT) 25 MG TABLET    Take 1 tablet by mouth 3 times daily as needed for Dizziness     ED Provider Disposition Time  DISPOSITION        Comment: Please note this report has been produced using speech recognition software and may contain errors related to that system including errors in grammar, punctuation, and spelling, as well as words and phrases that may be inappropriate. Efforts were made to edit the dictations.         Alfonso Vazquez DO  10/03/21 0004

## 2021-10-04 LAB
EKG ATRIAL RATE: 105 BPM
EKG DIAGNOSIS: NORMAL
EKG P AXIS: 64 DEGREES
EKG P-R INTERVAL: 172 MS
EKG Q-T INTERVAL: 312 MS
EKG QRS DURATION: 102 MS
EKG QTC CALCULATION (BAZETT): 412 MS
EKG R AXIS: 53 DEGREES
EKG T AXIS: 58 DEGREES
EKG VENTRICULAR RATE: 105 BPM

## 2021-10-04 PROCEDURE — 93010 ELECTROCARDIOGRAM REPORT: CPT | Performed by: INTERNAL MEDICINE

## 2022-08-11 ENCOUNTER — APPOINTMENT (OUTPATIENT)
Dept: GENERAL RADIOLOGY | Age: 52
End: 2022-08-11
Payer: OTHER GOVERNMENT

## 2022-08-11 ENCOUNTER — HOSPITAL ENCOUNTER (EMERGENCY)
Age: 52
Discharge: HOME | End: 2022-08-11
Payer: OTHER GOVERNMENT

## 2022-08-11 VITALS
OXYGEN SATURATION: 97 % | BODY MASS INDEX: 27.4 KG/M2 | RESPIRATION RATE: 16 BRPM | HEIGHT: 69 IN | SYSTOLIC BLOOD PRESSURE: 151 MMHG | HEART RATE: 72 BPM | DIASTOLIC BLOOD PRESSURE: 101 MMHG | TEMPERATURE: 98.4 F | WEIGHT: 185 LBS

## 2022-08-11 DIAGNOSIS — R03.0 ELEVATED BLOOD PRESSURE READING: ICD-10-CM

## 2022-08-11 DIAGNOSIS — R42 LIGHTHEADEDNESS: Primary | ICD-10-CM

## 2022-08-11 LAB
ALBUMIN SERPL-MCNC: 4.6 GM/DL (ref 3.4–5)
ALP BLD-CCNC: 86 IU/L (ref 40–129)
ALT SERPL-CCNC: 18 U/L (ref 10–40)
ANION GAP SERPL CALCULATED.3IONS-SCNC: 13 MMOL/L (ref 4–16)
AST SERPL-CCNC: 24 IU/L (ref 15–37)
BASOPHILS ABSOLUTE: 0.1 K/CU MM
BASOPHILS RELATIVE PERCENT: 1.1 % (ref 0–1)
BILIRUB SERPL-MCNC: 0.3 MG/DL (ref 0–1)
BUN BLDV-MCNC: 11 MG/DL (ref 6–23)
CALCIUM SERPL-MCNC: 9.2 MG/DL (ref 8.3–10.6)
CHLORIDE BLD-SCNC: 100 MMOL/L (ref 99–110)
CO2: 23 MMOL/L (ref 21–32)
CREAT SERPL-MCNC: 1 MG/DL (ref 0.9–1.3)
DIFFERENTIAL TYPE: ABNORMAL
EOSINOPHILS ABSOLUTE: 0.1 K/CU MM
EOSINOPHILS RELATIVE PERCENT: 1.3 % (ref 0–3)
GFR AFRICAN AMERICAN: >60 ML/MIN/1.73M2
GFR NON-AFRICAN AMERICAN: >60 ML/MIN/1.73M2
GLUCOSE BLD-MCNC: 148 MG/DL (ref 70–99)
HCT VFR BLD CALC: 47.8 % (ref 42–52)
HEMOGLOBIN: 15.6 GM/DL (ref 13.5–18)
IMMATURE NEUTROPHIL %: 0.4 % (ref 0–0.43)
LYMPHOCYTES ABSOLUTE: 1.8 K/CU MM
LYMPHOCYTES RELATIVE PERCENT: 32.8 % (ref 24–44)
MCH RBC QN AUTO: 28.5 PG (ref 27–31)
MCHC RBC AUTO-ENTMCNC: 32.6 % (ref 32–36)
MCV RBC AUTO: 87.4 FL (ref 78–100)
MONOCYTES ABSOLUTE: 0.4 K/CU MM
MONOCYTES RELATIVE PERCENT: 7.5 % (ref 0–4)
NUCLEATED RBC %: 0 %
PDW BLD-RTO: 12.8 % (ref 11.7–14.9)
PLATELET # BLD: 303 K/CU MM (ref 140–440)
PMV BLD AUTO: 8.9 FL (ref 7.5–11.1)
POTASSIUM SERPL-SCNC: 3.9 MMOL/L (ref 3.5–5.1)
RBC # BLD: 5.47 M/CU MM (ref 4.6–6.2)
SARS-COV-2, NAAT: NOT DETECTED
SEGMENTED NEUTROPHILS ABSOLUTE COUNT: 3.1 K/CU MM
SEGMENTED NEUTROPHILS RELATIVE PERCENT: 56.9 % (ref 36–66)
SODIUM BLD-SCNC: 136 MMOL/L (ref 135–145)
SOURCE: NORMAL
TOTAL IMMATURE NEUTOROPHIL: 0.02 K/CU MM
TOTAL NUCLEATED RBC: 0 K/CU MM
TOTAL PROTEIN: 7.7 GM/DL (ref 6.4–8.2)
TROPONIN T: <0.01 NG/ML
WBC # BLD: 5.5 K/CU MM (ref 4–10.5)

## 2022-08-11 PROCEDURE — 85025 COMPLETE CBC W/AUTO DIFF WBC: CPT

## 2022-08-11 PROCEDURE — 99285 EMERGENCY DEPT VISIT HI MDM: CPT

## 2022-08-11 PROCEDURE — 80053 COMPREHEN METABOLIC PANEL: CPT

## 2022-08-11 PROCEDURE — 93005 ELECTROCARDIOGRAM TRACING: CPT | Performed by: EMERGENCY MEDICINE

## 2022-08-11 PROCEDURE — 87635 SARS-COV-2 COVID-19 AMP PRB: CPT

## 2022-08-11 PROCEDURE — 71045 X-RAY EXAM CHEST 1 VIEW: CPT

## 2022-08-11 PROCEDURE — 84484 ASSAY OF TROPONIN QUANT: CPT

## 2022-08-11 NOTE — LETTER
[facility] Emergency Department  [address] [address]  Phone: [phone]  Fax: [phone]               August 11, 2022    Patient: Linh Franklin   YOB: 1970   Date of Visit: 8/11/2022       To Whom It May Concern:    Linh Franklin was seen and treated in our emergency department on 8/11/2022. He may return to work on 8/15/22.       Sincerely,       Patricia Clarke PA-C         Signature:__________________________________

## 2022-08-12 LAB
EKG ATRIAL RATE: 96 BPM
EKG DIAGNOSIS: NORMAL
EKG P AXIS: 67 DEGREES
EKG P-R INTERVAL: 160 MS
EKG Q-T INTERVAL: 334 MS
EKG QRS DURATION: 96 MS
EKG QTC CALCULATION (BAZETT): 421 MS
EKG R AXIS: 57 DEGREES
EKG T AXIS: 55 DEGREES
EKG VENTRICULAR RATE: 96 BPM

## 2022-08-12 PROCEDURE — 93010 ELECTROCARDIOGRAM REPORT: CPT | Performed by: INTERNAL MEDICINE

## 2022-08-12 NOTE — ED PROVIDER NOTES
EMERGENCY DEPARTMENT ENCOUNTER      PCP: No primary care provider on file. CHIEF COMPLAINT    Chief Complaint   Patient presents with    Loss of Consciousness     Near-syncope         This patient was not evaluated by the attending physician. I have independently evaluated this patient. HPI    Juno Seymour is a 46 y.o. male who presents with feeling fatigued today at work. Patient states his blood pressure was elevated and he was feeling lightheaded. Patient denies chest pain, shortness of breath, headache. Patient denies any syncopal episode. Patient states he has had slight cough, denies fever. Patient states he has generalized body aches. Patient states he is more stressed at work due to taking on more responsibility. Patient states he has been taking his blood pressure medication as prescribed. Patient states he did get rash to bilateral forearms a couple days ago, believes he may have got into some poison ivy or poison oak. Rash is itchy. Patient has tried Benadryl ointment with mild improvement. REVIEW OF SYSTEMS    Constitutional:  Denies fever  HENT:  Denies sore throat or ear pain   Cardiovascular:  Denies chest pain  Respiratory:  + cough Denies shortness of breath    GI:  Denies abdominal pain, vomiting, or diarrhea  :  Denies any urinary symptoms  Musculoskeletal:  see HPI  Skin:  see HPI  Neurologic:  see HPI Denies headache, focal weakness or sensory changes   Lymphatic:  Denies swollen glands     All other review of systems are negative  See HPI and nursing notes for additional information     PAST MEDICAL AND SURGICAL HISTORY    Past Medical History:   Diagnosis Date    Hypertension     Lung tumor     Panic attack      History reviewed. No pertinent surgical history.     CURRENT MEDICATIONS    Current Outpatient Rx   Medication Sig Dispense Refill    potassium chloride (KLOR-CON M) 20 MEQ extended release tablet Take 20 mEq by mouth Daily with supper      amLODIPine (NORVASC) 2.5 MG tablet Take 1.25 mg by mouth Daily with lunch 09/27/21 Patient states he splits a 2.5 mg tablet in half. ALLERGIES    Allergies   Allergen Reactions    Aspirin Hives     Patient states he get hives and has stomach irritation. Iodine     Midodrine        SOCIAL AND FAMILY HISTORY    Social History     Socioeconomic History    Marital status:      Spouse name: None    Number of children: None    Years of education: None    Highest education level: None   Tobacco Use    Smoking status: Never    Smokeless tobacco: Current     Types: Chew   Substance and Sexual Activity    Alcohol use: No    Drug use: Not Currently     History reviewed. No pertinent family history. PHYSICAL EXAM    VITAL SIGNS: BP (!) 151/101   Pulse 72   Temp 98.4 °F (36.9 °C) (Oral)   Resp 16   Ht 5' 9\" (1.753 m)   Wt 185 lb (83.9 kg)   SpO2 97%   BMI 27.32 kg/m²    Constitutional:  Well developed, Well nourished  HENT:  Normocephalic, Atraumatic, PERRL. EOMI. oropharynx is clear  Neck/Lymphatics: supple, no JVD, no swollen nodes  Cardiovascular:  Normal heart rate, Normal rhythm  Respiratory:  Nonlabored breathing. Normal breath sounds, No wheezing  Abdomen: Bowel sounds normal, Soft, No tenderness, no masses. Musculoskeletal: No edema, No tenderness, No cyanosis  Integument:  Warm, Dry  Neurologic:  Alert & oriented , No focal deficits noted. Sensation intact.   Psychiatric:  Affect normal, Mood normal.       Labs:  Results for orders placed or performed during the hospital encounter of 08/11/22   COVID-19, Rapid    Specimen: Nasopharyngeal   Result Value Ref Range    Source UNKNOWN     SARS-CoV-2, NAAT NOT DETECTED NOT DETECTED   CBC with Auto Differential   Result Value Ref Range    WBC 5.5 4.0 - 10.5 K/CU MM    RBC 5.47 4.6 - 6.2 M/CU MM    Hemoglobin 15.6 13.5 - 18.0 GM/DL    Hematocrit 47.8 42 - 52 %    MCV 87.4 78 - 100 FL    MCH 28.5 27 - 31 PG    MCHC 32.6 32.0 - 36.0 %    RDW 12.8 11.7 - 14.9 COVID-negative. CBC and CMP within normal limits. Troponin negative. I discussed labs and imaging with patient today. Recommend continuing blood pressure medication as prescribed, recommend follow-up with primary care provider in 2 days for recheck and return immediately if new or worsening symptoms develop. Clinical  IMPRESSION    1. Lightheadedness    2. Elevated blood pressure reading        I discussed with patient the importance of return to the emergency department immediately if new or worsening symptoms develop. Comment: Please note this report has been produced using speech recognition software and may contain errors related to that system including errors in grammar, punctuation, and spelling, as well as words and phrases that may be inappropriate. If there are any questions or concerns please feel free to contact the dictating provider for clarification.             Jeannine Hurtado PA-C  08/11/22 [id number]

## 2023-08-07 ENCOUNTER — APPOINTMENT (OUTPATIENT)
Dept: GENERAL RADIOLOGY | Age: 53
End: 2023-08-07
Payer: COMMERCIAL

## 2023-08-07 ENCOUNTER — HOSPITAL ENCOUNTER (EMERGENCY)
Age: 53
Discharge: HOME | End: 2023-08-07
Payer: COMMERCIAL

## 2023-08-07 VITALS
WEIGHT: 180 LBS | BODY MASS INDEX: 26.66 KG/M2 | RESPIRATION RATE: 15 BRPM | HEART RATE: 99 BPM | OXYGEN SATURATION: 97 % | HEIGHT: 69 IN | DIASTOLIC BLOOD PRESSURE: 94 MMHG | TEMPERATURE: 97.9 F | SYSTOLIC BLOOD PRESSURE: 157 MMHG

## 2023-08-07 DIAGNOSIS — M54.2 NECK PAIN: Primary | ICD-10-CM

## 2023-08-07 PROCEDURE — 72050 X-RAY EXAM NECK SPINE 4/5VWS: CPT

## 2023-08-07 PROCEDURE — 99283 EMERGENCY DEPT VISIT LOW MDM: CPT

## 2023-08-07 RX ORDER — ORPHENADRINE CITRATE 30 MG/ML
60 INJECTION INTRAMUSCULAR; INTRAVENOUS ONCE
Status: DISCONTINUED | OUTPATIENT
Start: 2023-08-07 | End: 2023-08-07

## 2023-08-07 RX ORDER — KETOROLAC TROMETHAMINE 15 MG/ML
30 INJECTION, SOLUTION INTRAMUSCULAR; INTRAVENOUS ONCE
Status: DISCONTINUED | OUTPATIENT
Start: 2023-08-07 | End: 2023-08-07

## 2023-08-07 NOTE — ED PROVIDER NOTES
EMERGENCY DEPARTMENT ENCOUNTER        Pt Name: Berenice Donnelly  MRN: [id number]  [address] [facility] 1970  Date of evaluation: 8/7/2023  Provider: ZORA Leslie  PCP: No primary care provider on file. ROMEL. I have evaluated this patient. Triage CHIEF COMPLAINT       Chief Complaint   Patient presents with    Back Pain     Pt states he got hit by a forklift at work on Thursday 8/3. (Workers comp)    Neck Pain       HISTORY OF PRESENT ILLNESS      Chief Complaint: Neck pain, lower back pain    Berenice Donnelly is a 45 y.o.  male who presents to the emergency department today with a chief complaint of neck and low back pain after a work incident that happened several days ago. 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 He states that it jolted/whiplash to him backwards. He states that he injured his knee, jolted his neck, hurt his lower back. Did not seek treatment at that point in time but has had worsening symptoms. He is coming today with left-sided neck pain into the trapezius area. He does have acute on chronic back pain but no new or alarming symptoms. He denies any headaches, visual disturbances. No chest pain or shortness of breath. No other abdominal pain. Has been taking NSAIDs without significant relief of his symptoms. Nursing Notes were all reviewed and agreed with or any disagreements were addressed in the HPI. REVIEW OF SYSTEMS     At least 10 systems reviewed and otherwise acutely negative except as in the [address] PAST MEDICAL HISTORY     Past Medical History:   Diagnosis Date    Hypertension     Lung tumor     Panic attack        SURGICAL HISTORY   History reviewed. No pertinent surgical history.      [facility]       Discharge Medication List as of 8/7/2023 12:32 PM        CONTINUE these medications which have NOT CHANGED    Details   potassium chloride (KLOR-CON M) 20 MEQ extended release tablet